# Patient Record
Sex: FEMALE | Race: WHITE
[De-identification: names, ages, dates, MRNs, and addresses within clinical notes are randomized per-mention and may not be internally consistent; named-entity substitution may affect disease eponyms.]

---

## 2019-11-27 ENCOUNTER — HOSPITAL ENCOUNTER (EMERGENCY)
Dept: HOSPITAL 76 - ED | Age: 29
Discharge: HOME | End: 2019-11-27
Payer: MEDICAID

## 2019-11-27 VITALS — DIASTOLIC BLOOD PRESSURE: 84 MMHG | SYSTOLIC BLOOD PRESSURE: 138 MMHG

## 2019-11-27 DIAGNOSIS — Z3A.41: ICD-10-CM

## 2019-11-27 LAB
ALBUMIN DIAFP-MCNC: 3.1 G/DL (ref 3.2–5.5)
ALBUMIN/GLOB SERPL: 1 {RATIO} (ref 1–2.2)
ALP SERPL-CCNC: 125 IU/L (ref 42–121)
ALT SERPL W P-5'-P-CCNC: 23 IU/L (ref 10–60)
ANION GAP SERPL CALCULATED.4IONS-SCNC: 12 MMOL/L (ref 6–13)
AST SERPL W P-5'-P-CCNC: 32 IU/L (ref 10–42)
BASOPHILS NFR BLD AUTO: 0 10^3/UL (ref 0–0.1)
BASOPHILS NFR BLD AUTO: 0.1 %
BILIRUB BLD-MCNC: 0.7 MG/DL (ref 0.2–1)
BUN SERPL-MCNC: 10 MG/DL (ref 6–20)
CALCIUM UR-MCNC: 9 MG/DL (ref 8.5–10.3)
CHLORIDE SERPL-SCNC: 102 MMOL/L (ref 101–111)
CO2 SERPL-SCNC: 22 MMOL/L (ref 21–32)
CREAT SERPLBLD-SCNC: 0.5 MG/DL (ref 0.4–1)
EOSINOPHIL # BLD AUTO: 0 10^3/UL (ref 0–0.7)
EOSINOPHIL NFR BLD AUTO: 0.1 %
ERYTHROCYTE [DISTWIDTH] IN BLOOD BY AUTOMATED COUNT: 13.8 % (ref 12–15)
GFRSERPLBLD MDRD-ARVRAT: 146 ML/MIN/{1.73_M2} (ref 89–?)
GLOBULIN SER-MCNC: 3.1 G/DL (ref 2.1–4.2)
GLUCOSE SERPL-MCNC: 146 MG/DL (ref 70–100)
HGB UR QL STRIP: 12.3 G/DL (ref 12–16)
LIPASE SERPL-CCNC: 27 U/L (ref 22–51)
LYMPHOCYTES # SPEC AUTO: 1.2 10^3/UL (ref 1.5–3.5)
LYMPHOCYTES NFR BLD AUTO: 5.2 %
MCH RBC QN AUTO: 31.4 PG (ref 27–31)
MCHC RBC AUTO-ENTMCNC: 33.4 G/DL (ref 32–36)
MCV RBC AUTO: 93.9 FL (ref 81–99)
MONOCYTES # BLD AUTO: 1 10^3/UL (ref 0–1)
MONOCYTES NFR BLD AUTO: 4.2 %
NEUTROPHILS # BLD AUTO: 20.9 10^3/UL (ref 1.5–6.6)
NEUTROPHILS # SNV AUTO: 23.5 X10^3/UL (ref 4.8–10.8)
NEUTROPHILS NFR BLD AUTO: 89.2 %
PDW BLD AUTO: 9.8 FL (ref 7.9–10.8)
PLAT MORPH BLD: (no result)
PLATELET # BLD: 204 10^3/UL (ref 130–450)
PLATELET BLD QL SMEAR: (no result)
PROT SPEC-MCNC: 6.2 G/DL (ref 6.7–8.2)
RBC MAR: 3.92 10^6/UL (ref 4.2–5.4)
RBC MORPH BLD: (no result)
SODIUM SERPLBLD-SCNC: 136 MMOL/L (ref 135–145)

## 2019-11-27 PROCEDURE — 85025 COMPLETE CBC W/AUTO DIFF WBC: CPT

## 2019-11-27 PROCEDURE — 96360 HYDRATION IV INFUSION INIT: CPT

## 2019-11-27 PROCEDURE — 99282 EMERGENCY DEPT VISIT SF MDM: CPT

## 2019-11-27 PROCEDURE — 83690 ASSAY OF LIPASE: CPT

## 2019-11-27 PROCEDURE — 84295 ASSAY OF SERUM SODIUM: CPT

## 2019-11-27 PROCEDURE — 80053 COMPREHEN METABOLIC PANEL: CPT

## 2019-11-27 PROCEDURE — 36415 COLL VENOUS BLD VENIPUNCTURE: CPT

## 2019-11-27 NOTE — CONSULTATION NOTE
DATE OF SERVICE: 2019

Physician: Efren Collazo DO

 

CHIEF COMPLAINT:  First-degree perineal laceration.

 

HISTORY OF CHIEF COMPLAINT:  Patient is a well-developed, well nourished 
29-year-old  2, para 2-0-0-2, female.  Approximately 1 hour ago, she had 
a planned home delivery with a midwife.  She had a normal vaginal delivery and 
the midwife did see a small first-degree perineal laceration.  The placenta was 
stated to have been delivered intact; however, at that point, the  was 
having respiratory distress, so the mom and the baby were transported by 
ambulance to the emergency room.  The midwife stated that she would have sutured
the patient if she was still at home or at the birthing center.  Patient states 
her prenatal course was unremarkable.  Other than that, the delivery was 
entirely unremarkable.

 

ALLERGIES:  NO KNOWN DRUG ALLERGIES.

 

MEDICATIONS:  None.

 

PAST MEDICAL HISTORY:  Entirely unremarkable.

 

FAMILY HISTORY:  Noncontributory.

 

SOCIAL STRESS HISTORY:  Patient denies any history of tobacco use, street drugs 
or alcohol.

 

REVIEW OF SYSTEMS:  The entire review of systems was otherwise unremarkable.

 

PHYSICAL EXAMINATION

VITAL SIGNS:  Revealed temperature is 36.5, heart rate 103, respirations 18, 
blood pressure is 129/83.  She has 100% oxygenation on room air. 

GENERAL:  This is a well-developed, well-nourished, 29-year-old white female in 
no acute distress.

ABDOMEN:  Soft, pliable and nontender.  The uterus was firm and nontender, 2 
fingerbreadths below the umbilicus. 

PELVIC:  There is a small amount of lochia, which is quite appropriate.  
Examination of the vulva does reveal a small first-degree perineal laceration at
the introitus.  There is evidence of previous lacerations from her first 
delivery.  Inspection of the vaginal vault found the rest of the vagina to be 
intact.  The cervix was intact.  No other lacerations on the vulva were 
appreciated.  External genitalia revealed normal developmental patterns.  There 
is normal female escutcheon.  Bartholins, and [place]'s glands were unremarkable.

 

PROCEDURE:  At this point in time, patient was prepped and draped in the usual 
fashion.  After an appropriate timeout took place.  The area of laceration was 
visualized and anesthetized with 2% lidocaine with epinephrine.  2-0 chromic 
suture was then used to repair the laceration in the usual fashion and 
hemostasis followed.  Patient tolerated the procedure well.

 

ASSESSMENT AND PLAN

1.  Immediate postpartum delivery at 41 weeks' gestation.

2.  First-degree perineal laceration.



PROCEDURES:

Repair of 1st degree perineal laceration.



 

PLAN:  Patient will be allowed to be discharged to the care of the midwife at 
this point in time.  As long as she does well, she will follow up with a 
midwife.  She knows to call immediately if she has any increased bleeding or 
signs of infection at the laceration site.

 

 

DD: 2019 09:25

TD: 2019 09:28

Job #: [id number]

[id number]

## 2019-11-27 NOTE — ED PHYSICIAN DOCUMENTATION
PD HPI FEMALE 





- Stated complaint


Stated Complaint: POST BIRTH





- Chief complaint


Chief Complaint: Abd Pain





- History obtained from


History obtained from: Patient





- History of Present Illness


Timing - onset: How many hours ago (1)


Timing - details: Abrupt onset (The patient is postpartum 1 hour at 41 weeks 

gestational age.  She had a planned home delivery with the midwife present and 

had several hours of contractions and then 20-minute second stage and then 

delivered with report of a small tear by the midwife.  The  she delivered

was having some respiratory distress and so mom and baby came up by ambulance.  

The midwife said she would have sutured the patient if there is still at home or

at the birthing center.  The patient states she is having some intermittent 

cramping.  She denies much bleeding.)


Associated symptoms: Pelvic pain (c/w post partum cramps), Vaginal pain, Vaginal

bleeding (mild).  No: Fever, Dysuria


Recently seen: Clinic (home delivery 1 hour PTA.)





Review of Systems


Constitutional: denies: Fever


Cardiac: denies: Chest pain / pressure


Respiratory: denies: Dyspnea


GI: denies: Abdominal Pain


: reports: Vaginal bleeding





PD PAST MEDICAL HISTORY





- Past Medical History


Past Medical History: No





- Present Medications


Home Medications: 


                                Ambulatory Orders











 Medication  Instructions  Recorded  Confirmed


 


No Known Home Medications  19














- Allergies


Allergies/Adverse Reactions: 


                                    Allergies











Allergy/AdvReac Type Severity Reaction Status Date / Time


 


No Known Drug Allergies Allergy   Verified 19 07:32














- Social History


Does the pt smoke?: No


Smoking Status: Never smoker





PD ED PE NORMAL





- Vitals


Vital signs reviewed: Yes





- General


General: Alert and oriented X 3, No acute distress, Well developed/nourished





- HEENT


HEENT: Pharynx benign





- Neck


Neck: Supple, no meningeal sign, No adenopathy





- Cardiac


Cardiac: RRR, No murmur





- Respiratory


Respiratory: Clear bilaterally





- Abdomen


Abdomen: Soft, Non distended, Other (minimal lower abd tenderness. The fundus 

feels firm.)





- Female 


Female : Deferred





- Derm


Derm: Normal color, Warm and dry





- Neuro


Neuro: Alert and oriented X 3, No motor deficit, Normal speech





Results





- Vitals


Vitals: 


                               Vital Signs - 24 hr











  19





  07:30 11:36


 


Temperature 36.5 C 37.0 C


 


Heart Rate 103 H 115 H


 


Respiratory 18 16





Rate  


 


Blood Pressure 129/83 H 138/84 H


 


O2 Saturation 100 100








                                     Oxygen











O2 Source                      Room air

















- Labs


Labs: 


                                Laboratory Tests











  19





  07:54 07:54


 


WBC  23.5 H 


 


RBC  3.92 L 


 


Hgb  12.3 


 


Hct  36.8 L 


 


MCV  93.9 


 


MCH  31.4 H 


 


MCHC  33.4 


 


RDW  13.8 


 


Plt Count  204 


 


MPV  9.8 


 


Neut # (Auto)  20.9 H 


 


Lymph # (Auto)  1.2 L 


 


Mono # (Auto)  1.0 


 


Eos # (Auto)  0.0 


 


Baso # (Auto)  0.0 


 


Absolute Nucleated RBC  0.00 


 


Nucleated RBC %  0.0 


 


Manual Slide Review  Indicated 


 


WBC Morphology  NORMAL APPEARANCE 


 


Platelet Estimate  NORMAL (130-450,000) 


 


Platelet Morphology  NORMAL APPEARANCE 


 


RBC Morph Micro Appear  NORMAL APPEARANCE 


 


Sodium   136


 


Potassium   3.6


 


Chloride   102


 


Carbon Dioxide   22


 


Anion Gap   12.0


 


BUN   10


 


Creatinine   0.5


 


Estimated GFR (MDRD)   146


 


Glucose   146 H


 


Calcium   9.0


 


Total Bilirubin   0.7


 


AST   32


 


ALT   23


 


Alkaline Phosphatase   125 H


 


Total Protein   6.2 L


 


Albumin   3.1 L


 


Globulin   3.1


 


Albumin/Globulin Ratio   1.0


 


Lipase   27














PD MEDICAL DECISION MAKING





- ED course


Complexity details: d/w patient, d/w consultant (Will consult OB/GYN to evaluate

for suturing of a vaginal tear and other postpartum care.  The patient otherwise

appears well at this point.)





Departure





- Departure


Disposition: 01 Home, Self Care


Clinical Impression: 


 Postpartum state, Vaginal tear resulting from childbirth





Condition: Stable


Record reviewed to determine appropriate education?: Yes


Follow-Up: 


Efren Collazo,  [Provider Admit Priv/Credential] - 


Polina Landry LMW [Physician No Access] - 


Comments: 


Stay well-hydrated.  Care of the vaginal tear and general postpartum care per 

your midwife.  Follow-up with gynecology if needed.  Tylenol ibuprofen as needed

for pains.


Discharge Date/Time: 19 11:37

## 2020-03-06 ENCOUNTER — HOSPITAL ENCOUNTER (OUTPATIENT)
Dept: HOSPITAL 76 - DI | Age: 30
Discharge: HOME | End: 2020-03-06
Attending: NURSE PRACTITIONER
Payer: MEDICAID

## 2020-03-06 DIAGNOSIS — N63.20: Primary | ICD-10-CM

## 2020-03-06 PROCEDURE — 76642 ULTRASOUND BREAST LIMITED: CPT

## 2023-03-01 NOTE — ULTRASOUND REPORT
Reason:  LT BREAST LUMP

Procedure Date:  03/06/2020   

Accession Number:  [id number] / [id number]                    

Procedure:  US  - Breast Unilateral Limited CPT Code:  

 

***Final Report***

 

 

FULL RESULT:

 

 

EXAM: Breast Unilateral Limited

 

DATE: 3/6/2020 12:24 PM

 

CLINICAL HISTORY: Palpable lump which per the patient has decreased in 

size since it was noticed 2 months ago. The patient is currently 

breast-feeding.

 

COMPARISON: None.

 

TECHNIQUE: Targeted ultrasound was performed of the left breast in the 

area of clinical concern in the retroareolar region. Color Doppler was 

employed as appropriate.

 

FINDINGS: A wider than tall hypoechoic mass which concerns low level 

echoes demonstrates a imperceptibly thin wall and is well circumscribed 

with pronounced increased through transmission is most consistent with 

ductal ectasia in the setting of breast-feeding, probably benign.

 

 IMPRESSION: Probable benign findings

 

RECOMMENDATION: 6 month follow-up ultrasound.

 

BIRADS CATEGORY 3

 

[employer] Vaccine Information Statement(s) or the Emergency Use Authorization was given today. This has been reviewed, questions answered, and verbal consent given by Patient for injection(s) and administration of Hepatitis B.        Patient tolerated without incident. See immunization grid for documentation.